# Patient Record
Sex: MALE
[De-identification: names, ages, dates, MRNs, and addresses within clinical notes are randomized per-mention and may not be internally consistent; named-entity substitution may affect disease eponyms.]

---
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
## 2024-06-19 ENCOUNTER — APPOINTMENT (OUTPATIENT)
Dept: GENERAL RADIOLOGY | Facility: HOSPITAL | Age: 19
End: 2024-06-19
Attending: EMERGENCY MEDICINE

## 2024-06-19 ENCOUNTER — HOSPITAL ENCOUNTER (EMERGENCY)
Facility: HOSPITAL | Age: 19
Discharge: HOME | End: 2024-06-20
Attending: EMERGENCY MEDICINE

## 2024-06-19 ENCOUNTER — APPOINTMENT (OUTPATIENT)
Dept: CT IMAGING | Facility: HOSPITAL | Age: 19
End: 2024-06-19
Attending: EMERGENCY MEDICINE

## 2024-06-19 DIAGNOSIS — M25.561 ACUTE PAIN OF RIGHT KNEE: ICD-10-CM

## 2024-06-19 DIAGNOSIS — S89.91XA INJURY OF RIGHT KNEE, INITIAL ENCOUNTER: ICD-10-CM

## 2024-06-19 DIAGNOSIS — S89.91XA INJURY OF POSTERIOR CRUCIATE LIGAMENT OF RIGHT KNEE, INITIAL ENCOUNTER: Primary | ICD-10-CM

## 2024-06-19 LAB
ANION GAP SERPL CALC-SCNC: 6 MMOL/L (ref 0–18)
BUN BLD-MCNC: 13 MG/DL (ref 9–23)
BUN/CREAT SERPL: 12.1 (ref 10–20)
CALCIUM BLD-MCNC: 9.6 MG/DL (ref 8.7–10.4)
CHLORIDE SERPL-SCNC: 108 MMOL/L (ref 98–112)
CO2 SERPL-SCNC: 29 MMOL/L (ref 21–32)
CREAT BLD-MCNC: 1.07 MG/DL
EGFRCR SERPLBLD CKD-EPI 2021: 103 ML/MIN/1.73M2 (ref 60–?)
GLUCOSE BLD-MCNC: 102 MG/DL (ref 70–99)
OSMOLALITY SERPL CALC.SUM OF ELEC: 296 MOSM/KG (ref 275–295)
POTASSIUM SERPL-SCNC: 4.1 MMOL/L (ref 3.5–5.1)
SODIUM SERPL-SCNC: 143 MMOL/L (ref 136–145)

## 2024-06-19 PROCEDURE — 96375 TX/PRO/DX INJ NEW DRUG ADDON: CPT

## 2024-06-19 PROCEDURE — 80048 BASIC METABOLIC PNL TOTAL CA: CPT | Performed by: EMERGENCY MEDICINE

## 2024-06-19 PROCEDURE — 99284 EMERGENCY DEPT VISIT MOD MDM: CPT

## 2024-06-19 PROCEDURE — 96374 THER/PROPH/DIAG INJ IV PUSH: CPT

## 2024-06-19 PROCEDURE — 73706 CT ANGIO LWR EXTR W/O&W/DYE: CPT | Performed by: EMERGENCY MEDICINE

## 2024-06-19 RX ORDER — MORPHINE SULFATE 4 MG/ML
4 INJECTION, SOLUTION INTRAMUSCULAR; INTRAVENOUS ONCE
Status: COMPLETED | OUTPATIENT
Start: 2024-06-19 | End: 2024-06-20

## 2024-06-19 RX ORDER — MORPHINE SULFATE 4 MG/ML
4 INJECTION, SOLUTION INTRAMUSCULAR; INTRAVENOUS ONCE
Status: COMPLETED | OUTPATIENT
Start: 2024-06-19 | End: 2024-06-19

## 2024-06-19 RX ORDER — KETOROLAC TROMETHAMINE 10 MG/1
10 TABLET, FILM COATED ORAL EVERY 6 HOURS PRN
Qty: 28 TABLET | Refills: 0 | Status: SHIPPED | OUTPATIENT
Start: 2024-06-19 | End: 2024-06-26

## 2024-06-19 RX ORDER — KETOROLAC TROMETHAMINE 15 MG/ML
15 INJECTION, SOLUTION INTRAMUSCULAR; INTRAVENOUS ONCE
Status: COMPLETED | OUTPATIENT
Start: 2024-06-19 | End: 2024-06-19

## 2024-06-19 RX ORDER — HYDROCODONE BITARTRATE AND ACETAMINOPHEN 5; 325 MG/1; MG/1
1 TABLET ORAL EVERY 6 HOURS PRN
Qty: 10 TABLET | Refills: 0 | Status: SHIPPED | OUTPATIENT
Start: 2024-06-19

## 2024-06-19 RX ORDER — LIDOCAINE 50 MG/G
1 PATCH TOPICAL EVERY 24 HOURS
Qty: 14 PATCH | Refills: 0 | Status: SHIPPED | OUTPATIENT
Start: 2024-06-19

## 2024-06-20 VITALS
OXYGEN SATURATION: 97 % | DIASTOLIC BLOOD PRESSURE: 86 MMHG | TEMPERATURE: 99 F | RESPIRATION RATE: 18 BRPM | HEART RATE: 76 BPM | SYSTOLIC BLOOD PRESSURE: 140 MMHG

## 2024-06-20 PROCEDURE — 96376 TX/PRO/DX INJ SAME DRUG ADON: CPT

## 2024-06-20 NOTE — ED PROVIDER NOTES
[place] Emergency Department Note  Patient: Sukumar Winston Age: 18 year old Sex: male      MRN: [id number]  : 2005    Patient Seen in: [facility] Emergency Department    History     Chief Complaint   Patient presents with    Knee Pain     Stated Complaint: knee injury    History obtained from: patient     This is a very pleasant otherwise healthy 18-year-old presents to the ER with complaints of right knee pain.  Patient states earlier tonight he was playing basketball approximately 2 hours prior to arrival, and as he came down he was struck by another player mid air and felt a pop in his right knee.  He feels like it may have briefly dislocated but he is not sure.  He states when coming down he had severe pain in the knee and has been unable to straighten it since this occurred.  He did not hit his head or pass out but did land partially on his back on the ground.  He does report a tingling sensation in his right knee and thigh area.  He denies any right ankle or foot pain.  Denies any back or neck pain.  No headache or dizziness.  He was seen at urgent care where he was only able to get a lateral x-ray done due to severe pain and was sent to the ER for further evaluation.  Currently he rates the pain as 10 out of 10 in severity.    Review of Systems:  Review of Systems  Positive for stated complaint: knee injury. Constitutional and vital signs reviewed. All other systems reviewed and negative except as noted above.    Patient History:  History reviewed. No pertinent past medical history.    No past surgical history on file.     History reviewed. No pertinent family history.    Specific Social Determinants of Health:   Social History     Socioeconomic History    Marital status: Single           PSFH elements reviewed from today and agreed except as otherwise stated in HPI.    Physical Exam     ED Triage Vitals [24]   /90   Pulse 90   Resp 22   Temp 98.8 °F (37.1 °C)   Temp src Oral    SpO2 98 %   O2 Device None (Room air)       Current:/84   Pulse 75   Temp 98.8 °F (37.1 °C) (Oral)   Resp 18   SpO2 98%         Physical Exam  Vitals and nursing note reviewed.   Constitutional:       General: He is not in acute distress.     Appearance: He is not ill-appearing.   HENT:      Head: Normocephalic and atraumatic.      Right Ear: External ear normal.      Left Ear: External ear normal.      Mouth/Throat:      Pharynx: Oropharynx is clear.   Eyes:      Conjunctiva/sclera: Conjunctivae normal.   Cardiovascular:      Rate and Rhythm: Normal rate and regular rhythm.      Heart sounds: No murmur heard.     Comments: 2+ dp pulses bilaterally   Pulmonary:      Effort: Pulmonary effort is normal. No respiratory distress.      Breath sounds: No wheezing.   Abdominal:      General: There is no distension.      Palpations: Abdomen is soft.   Musculoskeletal:         General: Swelling and tenderness present. No deformity.      Cervical back: Neck supple.      Comments: No midline c/t/l spine ttp, no stepoff or deformity. No ttp throughout LLE. Compartments soft in the R thigh and R calf. Ttp to medial and lateral joint line of the R knee, no patellar ttp, mild ttp to R knee anterior joint line, pt maintains flexed position sts unable to straighten 2/2 pain. Negative A/p drawer no obvious ligamentous laxity. No ttp to R hip, shin, ankle, or foot. Pt able to wiggle toes refusing to range R hip or ankle 2/2 R knee pain.    Skin:     General: Skin is warm and dry.      Capillary Refill: Capillary refill takes less than 2 seconds.      Findings: No bruising or rash.   Neurological:      Mental Status: He is alert.      Sensory: No sensory deficit.         ED Course   Labs:   Labs Reviewed   BASIC METABOLIC PANEL (8) - Abnormal; Notable for the following components:       Result Value    Glucose 102 (*)     Calculated Osmolality 296 (*)     All other components within normal limits     Radiology findings:  I  personally reviewed the images.   No results found.        MDM   This patient presents with severe R knee pain and inability fully to straighten his right knee after injury during basketball.  He has intact distal pulses though severely limited strength and range of motion exam secondary to severe pain of the right knee and patient is refusing to fully straighten the knee.  No obvious ligamentous laxity, and does have soft compartments in the right thigh and calf, without any other reproducible areas of tenderness.    Differential diagnoses considered includes, but is not limited to:   Knee fracture  Poss brief patellar or knee dislocation though no exam evidence of deformity c/w dislocation  Soft tissue or ligamentous injury including ACL tear, quadriceps tendon tear      Will obtain the following tests: CT R knee   Will administer the following medications/therapies: morphine, toradol, lido patch     Please see ED course for my independent review of these tests/imaging results.    Chronic conditions affecting care: n/A       ED Course as of 06/20/24 0043  ------------------------------------------------------------  Time: 06/19 2252  Value: CREATININE: 1.07  Comment: (Reviewed)  ------------------------------------------------------------  Time: 06/19 2346  Comment: Case d/w [facility] ortho Dr. Monroe sts if CT neg for acute finding recommends knee immobilizer and f/u within 1 week with him   ------------------------------------------------------------  Time: 06/19 2347  Comment: Pt signed out to Dr. Jara pending CT   ------------------------------------------------------------  Time: 06/20 0043  Comment:  no fx/vasc injury but small joint effusion with irregular appearing PCL raising concern for acute PCL injury -tibial attachment appears disrupted. Updated ortho agrees with knee immob and f/u outpatient. Pain meds sent to pharmacy. Pt updated with results and plan by Dr. Jara              Procedures:  Procedures        Disposition and Plan     Clinical Impression:  1. Injury of posterior cruciate ligament of right knee, initial encounter    2. Acute pain of right knee    3. Injury of right knee, initial encounter        Disposition:  Discharge     Follow-up:  [facility] - [place]  [address]  [facility] [id number] [phone]  Schedule an appointment as soon as possible for a visit in 1 week(s)  Follow up with Dr. Siva Monroe  [phone]    [facility] Emergency Department  [address]  [facility] [address]  [phone]  Go to  If symptoms worsen, immediately    [address]  [facility] [address] [phone]    Schedule an appointment as soon as possible for a visit in 2 day(s)        Medications Prescribed:  Current Discharge Medication List        START taking these medications    Details   Ketorolac Tromethamine 10 MG Oral Tab Take 1 tablet (10 mg total) by mouth every 6 (six) hours as needed.  Qty: 28 tablet, Refills: 0    Associated Diagnoses: Acute pain of right knee; Injury of right knee, initial encounter      HYDROcodone-acetaminophen 5-325 MG Oral Tab Take 1 tablet by mouth every 6 (six) hours as needed for Pain.  Qty: 10 tablet, Refills: 0    Associated Diagnoses: Acute pain of right knee; Injury of right knee, initial encounter      lidocaine 5 % External Patch Place 1 patch onto the skin daily.  Qty: 14 patch, Refills: 0               This note may have been created using voice dictation technology and may include inadvertent errors.      Katherine Wilde,   Attending Physician   Emergency Medicine

## 2024-06-20 NOTE — ED QUICK NOTES
Care endorsed to Medina COLLAZO.  Patient resting on bed, on VS monitor.    No new requests at this time, awaiting CT scan.       Pt states pain relief from pain meds.

## 2024-06-20 NOTE — ED INITIAL ASSESSMENT (HPI)
Patient presents with right knee pain after playing basketball and coming down on it and hearing a pop.

## 2024-06-20 NOTE — DISCHARGE INSTRUCTIONS
Thank you for seeking care at [facility] Emergency Department.    You have been seen in the ED today for right knee pain. You are stable to be discharged home, and follow up with orthopedics as an outpatient.  Your CT scan showed injury to the posterior cruciate ligament (PCL). Wear the knee immobilizer at all times and do not bear weight on the leg. Call the orthopedist in the morning.    Elevated the region of injury if possible to reduce swelling    Take medications as prescribed    Please be sure to call the orthopedic specialist tomorrow to schedule further care in approximately 1 week.     Remember, your care process does not end after your visit today. Please follow-up with your doctor within 1-2 days for a follow-up check to ensure you are  improving, to see if you need any further evaluation/testing, or to evaluate for any alternate diagnoses.    Please return to the emergency department if you develop significant worsening of pain, numbness or tingling of the extremity, discoloration of the skin, fevers, or if you develop any other new or concerning symptoms as these could be signs of more serious medical illness.    We hope you feel better.